# Patient Record
(demographics unavailable — no encounter records)

---

## 2024-10-14 NOTE — HISTORY OF PRESENT ILLNESS
[TextBox_4] :  Mr. LEE is a 29 year male nonsmoker with a history of appendectomy (2019) s/p Traumatic fall in [facility] complicated by hemopneumothorax, splenic damage, ruptured spleen s/p left thoracotomy, chest tube placement and removal (2022) with associated concussion and multiple rip fractures, COVID 19 x2 (2024), PNA(2024), RSV (2024) who now comes in for an initial pulmonary evaluation. His chief complaint is  -he notes he had COVID 19 (8/2024) and now has RSV, COVID 19(new variant) and PNA as of two weeks ago with related tachycardia, SOB -he has COVID 19 in the lower part of his lungs and hospitalized in [facility]  -he notes very dry cough and wheezing which are new sx  he notes vision is stable  He notes that His balance is good.  -he notes his weight is stable  -he notes he was placed on oral anbx -he denies snoring and wakes up well rested  -he notes his sense of smell and taste are normal - he notes His bowels are normal.   -Patient denies any headaches, nausea, vomiting, fever, chills, sweats, chest pain, chest pressure, palpitations, fatigue, diarrhea, constipation, dysphagia, arthralgias, myalgias, dizziness, leg swelling, leg pain, itchy eyes, itchy ears, dysphonia, heartburn, reflux or sour taste in mouth.

## 2024-10-14 NOTE — PROCEDURE
[FreeTextEntry1] :  CXR revealed mild elevated left hemidiaphragm left pleural scarring   Full PFT reveals normal flows; FEV1 was 3.63 L which is 75 % of predicted, with a 17% improvement on Bronchodilator, normal lung volumes, mild diffusions which corrects for normal, at 24.10 L which is 65% predicted, normal flow volume loop. PFT's for performed to evaluate for SOB.    Six Minute Walk test reveals a low saturation of 92 % with no evidence of Dyspnea or Fatigue; walked 391.2 meters.   FENO was34 ; a normal value being less than 25. Fractional exhaled nitric oxide (FENO) is regarded as a simple, noninvasive method for assessing eosinophilic airway inflammation. Produced by a variety of cells within the lung, nitric oxide (NO) concentrations are generally low in healthy individuals. However, high concentrations of NO appear to be involved in nonspecific host defense mechanisms and chronic inflammatory diseases such as asthma. The American Thoracic Society (ATS) therefore has strongly recommended using FENO to aid in the assessment, management, and long-term monitoring of eosinophilic airway inflammation and asthma, and for identifying steroid responsive individuals whose chronic respiratory symptoms may be caused by airway inflammation. In their 2011 clinical practice guideline, the ATS emphasizes the importance of using FENO.

## 2024-10-14 NOTE — ASSESSMENT
[FreeTextEntry1] :  Mr. LEE is a 29 year male nonsmoker with a history of appendectomy(2019) s/p Traumatic fall in [facility] complicated by hemopneumothorax, splenic damage, ruptured spleen s/p left thoracotomy, chest tube placement and removal (2022) with associated concussion and multiple rip fractures, COVID 19 x2 (2024), PNA(2024), RSV (2024) who now comes in for an initial pulmonary evaluation for s/p COVID 19 (8/2024) followed by RS/COVID 19, and right sided PNA in 10/2024 complicated by SOB, RAD's akin to asthmatic type bronchitis, abnormal OFT's combined restrictive and obstructive dysfunction       The patient's SOB is felt to be multifactorial: -out of shape/overweight -poor mechanics of breathing -Pulmonary     - Post PNA/COVID-19 /Rhino virus bronchospasm (akin to asthma)      - hx of right sided PNA -Cardiac   Problem 1:Post PNA/COVID-19 /Rhino virus bronchospasm (akin to asthma) -Add Symbicort 160 2 inhalations BID  -Add short course of prednisone 30mg for 5 days, then 20mg for 5 days, then 10mg for5 days Nutritional counseling and suggestions on lifestyle modifications for the diagnosis of [ ] provided. Refer to Nutrition for further recommendations and safe and effective implementation of dietary intervention. Dietary goals will be discussed between myself and the Registered Dietitian in subsequent visits.  -Your chest xray/CT reveals an infiltrate c/w pneumonia; this based on your clinical scenario required additional therapy. Any change in your status will require hospitalization at the nearest hospital and al local ambulance will need to be called. Our group is on staff at [facility] as consultants. The patient/family is instructed to notify our office with any change in condition.  Problem 1A: hx of right sided PNA (radiographically resolved)- 10/14/2024  -Add Benzonatate 200 mg TID (Tessalon Perles)  -Your chest xray/CT reveals an infiltrate c/w pneumonia; this based on your clinicalscenario required additional therapy. Any change in your status will require hospitalization at the nearest hospital and al local ambulance will need to be called. Our group is on staff at [facility] as consultants. The patient/family is instructed to notify our office with any change in condition.  problem 2: Abnormal PFT's - mild  restrictive and obstructive dysfunction   problem 2A: restrictive dysfunction (prior hemopneumothorax)- resolved but with residual pleural thickening and elevated left hemidiaphragm   Problem 2B: obstructive dysfunction (due to current illness) -therapy as above    Problem 3: Cardiac -Recommend cardiac follow up evaluation with cardiologist if needed   Problem 4: overweight/out of shape -Recommended Dougie Perez's 10-day detox diet and book. - Weight loss, exercise and diet control were discussed and are highly encouraged. Treatment options were given such as aqua therapy, and contacting a nutritionist. Recommended to use the elliptical, stationary bike, less use of treadmill. Mindful eating was explained to the patient. Obesity is associated with worsening asthma, SOB, and potential for cardiac disease, diabetes, and other underlying medical conditions.   Problem 5: Poor mechanics of breathing -Recommended Wibryce Hof and Butdarellko breathing techniques -Recommended [email or web address] and to YouTube "aerobic exercises for seniors" -Proper breathing techniques were reviewed with an emphasis on exhalation. Patient instructed to breath in for 1 second and out for four seconds. Patient was encouraged not to talk while walking.   Problem 6: Health Maintenance -recommended Sanotize anti viral nasal spray in case of viral infection -s/p flu shot -recommended strep pneumonia vaccines: Prevnar-20 vaccine, follow by Pneumo vaccine 23 one year following -recommended early intervention for URIs -recommended regular osteoporosis evaluations -recommended early dermatological evaluations -recommended after the age of 50 to the age of 70, colonoscopy every 5 years   f/u in 6-8 weeks pt is encouraged to call or fax the office with any questions or concerns.

## 2024-10-14 NOTE — PHYSICAL EXAM
[No Acute Distress] : no acute distress [Normal Oropharynx] : normal oropharynx [III] : Mallampati Class: III [Normal Appearance] : normal appearance [No Neck Mass] : no neck mass [Normal Rate/Rhythm] : normal rate/rhythm [Normal S1, S2] : normal s1, s2 [No Murmurs] : no murmurs [No Resp Distress] : no resp distress [No Abnormalities] : no abnormalities [Benign] : benign [Normal Gait] : normal gait [No Clubbing] : no clubbing [No Cyanosis] : no cyanosis [No Edema] : no edema [FROM] : FROM [Normal Color/ Pigmentation] : normal color/ pigmentation [No Focal Deficits] : no focal deficits [Oriented x3] : oriented x3 [Normal Affect] : normal affect [Kyphosis] : kyphosis [TextBox_54] : . [TextBox_68] :  I:E 1:3; expiratory wheezes bilaterally

## 2024-10-14 NOTE — REASON FOR VISIT
[Initial] : an initial visit [TextBox_44] : s/p COVID 19 (8/2024) followed by RS/COVID 19, and right sided PNA in 10/2024 complicated by SOB, RAD's akin to asthmatic type bronchitis, abnormal OFT's combined restrictive and obstructive dysfunction

## 2024-10-14 NOTE — ADDENDUM
[FreeTextEntry1] :  Documented by Mary Britt acting as a scribe for Dr. Joe Garvey on 10/14/2024 .   All medical record entries made by the Scribe were at my, Dr. Joe Garvey's direction and personally dictated by me on 10/14/2024 . I have reviewed the chart and agree that the record accurately reflects my personal performance of the history, Physical exam, assessment, and plan. I have also personally directed, reviewed, and agree with the discharge instructions.
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
## 2025-06-26 NOTE — HEALTH RISK ASSESSMENT
[Good] : ~his/her~  mood as  good [No falls in past year] : Patient reported no falls in the past year [Little interest or pleasure doing things] : 1) Little interest or pleasure doing things [Feeling down, depressed, or hopeless] : 2) Feeling down, depressed, or hopeless [0] : 2) Feeling down, depressed, or hopeless: Not at all (0) [PHQ-2 Negative - No further assessment needed] : PHQ-2 Negative - No further assessment needed [[id number]] : 0 [Never] : Never [] :  [Fully functional (bathing, dressing, toileting, transferring, walking, feeding)] : Fully functional (bathing, dressing, toileting, transferring, walking, feeding) [Fully functional (using the telephone, shopping, preparing meals, housekeeping, doing laundry, using] : Fully functional and needs no help or supervision to perform IADLs (using the telephone, shopping, preparing meals, housekeeping, doing laundry, using transportation, managing medications and managing finances) [Reports changes in hearing] : Reports no changes in hearing [Reports changes in vision] : Reports no changes in vision [Reports normal functional visual acuity (ie: able to read med bottle)] : Reports normal functional visual acuity [Reports changes in dental health] : Reports no changes in dental health

## 2025-06-26 NOTE — HEALTH RISK ASSESSMENT
[Good] : ~his/her~  mood as  good [No falls in past year] : Patient reported no falls in the past year [Little interest or pleasure doing things] : 1) Little interest or pleasure doing things [Feeling down, depressed, or hopeless] : 2) Feeling down, depressed, or hopeless [0] : 2) Feeling down, depressed, or hopeless: Not at all (0) [PHQ-2 Negative - No further assessment needed] : PHQ-2 Negative - No further assessment needed [[id number]] : 0 [Never] : Never [] :  [Fully functional (bathing, dressing, toileting, transferring, walking, feeding)] : Fully functional (bathing, dressing, toileting, transferring, walking, feeding) [Fully functional (using the telephone, shopping, preparing meals, housekeeping, doing laundry, using] : Fully functional and needs no help or supervision to perform IADLs (using the telephone, shopping, preparing meals, housekeeping, doing laundry, using transportation, managing medications and managing finances) [Reports changes in hearing] : Reports no changes in hearing [Reports changes in vision] : Reports no changes in vision [Reports normal functional visual acuity (ie: able to read med bottle)] : Reports normal functional visual acuity [Reports changes in dental health] : Reports no changes in dental health
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
